# Patient Record
Sex: MALE | Race: WHITE | NOT HISPANIC OR LATINO | URBAN - METROPOLITAN AREA
[De-identification: names, ages, dates, MRNs, and addresses within clinical notes are randomized per-mention and may not be internally consistent; named-entity substitution may affect disease eponyms.]

---

## 2018-01-02 ENCOUNTER — OFFICE VISIT CONVERTED (OUTPATIENT)
Dept: CARDIOLOGY | Facility: CLINIC | Age: 68
End: 2018-01-02
Attending: SPECIALIST

## 2018-01-02 ENCOUNTER — CONVERSION ENCOUNTER (OUTPATIENT)
Dept: OTHER | Facility: HOSPITAL | Age: 68
End: 2018-01-02

## 2018-02-14 ENCOUNTER — CONVERSION ENCOUNTER (OUTPATIENT)
Dept: FAMILY MEDICINE CLINIC | Facility: CLINIC | Age: 68
End: 2018-02-14

## 2018-02-14 ENCOUNTER — OFFICE VISIT CONVERTED (OUTPATIENT)
Dept: FAMILY MEDICINE CLINIC | Facility: CLINIC | Age: 68
End: 2018-02-14
Attending: NURSE PRACTITIONER

## 2018-04-10 ENCOUNTER — OFFICE VISIT CONVERTED (OUTPATIENT)
Dept: CARDIOLOGY | Facility: CLINIC | Age: 68
End: 2018-04-10
Attending: SPECIALIST

## 2018-05-22 ENCOUNTER — CONVERSION ENCOUNTER (OUTPATIENT)
Dept: FAMILY MEDICINE CLINIC | Facility: CLINIC | Age: 68
End: 2018-05-22

## 2018-05-22 ENCOUNTER — OFFICE VISIT CONVERTED (OUTPATIENT)
Dept: FAMILY MEDICINE CLINIC | Facility: CLINIC | Age: 68
End: 2018-05-22
Attending: NURSE PRACTITIONER

## 2018-07-03 ENCOUNTER — CONVERSION ENCOUNTER (OUTPATIENT)
Dept: FAMILY MEDICINE CLINIC | Facility: CLINIC | Age: 68
End: 2018-07-03

## 2018-07-03 ENCOUNTER — OFFICE VISIT CONVERTED (OUTPATIENT)
Dept: FAMILY MEDICINE CLINIC | Facility: CLINIC | Age: 68
End: 2018-07-03
Attending: NURSE PRACTITIONER

## 2018-07-10 ENCOUNTER — OFFICE VISIT CONVERTED (OUTPATIENT)
Dept: CARDIOLOGY | Facility: CLINIC | Age: 68
End: 2018-07-10
Attending: SPECIALIST

## 2018-08-15 ENCOUNTER — OFFICE VISIT CONVERTED (OUTPATIENT)
Dept: FAMILY MEDICINE CLINIC | Facility: CLINIC | Age: 68
End: 2018-08-15
Attending: NURSE PRACTITIONER

## 2018-08-20 ENCOUNTER — OFFICE VISIT CONVERTED (OUTPATIENT)
Dept: FAMILY MEDICINE CLINIC | Facility: CLINIC | Age: 68
End: 2018-08-20
Attending: NURSE PRACTITIONER

## 2018-10-16 ENCOUNTER — CONVERSION ENCOUNTER (OUTPATIENT)
Dept: CARDIOLOGY | Facility: CLINIC | Age: 68
End: 2018-10-16

## 2018-10-16 ENCOUNTER — OFFICE VISIT CONVERTED (OUTPATIENT)
Dept: CARDIOLOGY | Facility: CLINIC | Age: 68
End: 2018-10-16
Attending: SPECIALIST

## 2018-11-27 ENCOUNTER — OFFICE VISIT CONVERTED (OUTPATIENT)
Dept: FAMILY MEDICINE CLINIC | Facility: CLINIC | Age: 68
End: 2018-11-27
Attending: NURSE PRACTITIONER

## 2018-12-05 ENCOUNTER — OFFICE VISIT CONVERTED (OUTPATIENT)
Dept: FAMILY MEDICINE CLINIC | Facility: CLINIC | Age: 68
End: 2018-12-05
Attending: NURSE PRACTITIONER

## 2018-12-26 ENCOUNTER — OFFICE VISIT CONVERTED (OUTPATIENT)
Dept: UROLOGY | Facility: CLINIC | Age: 68
End: 2018-12-26
Attending: UROLOGY

## 2018-12-26 ENCOUNTER — CONVERSION ENCOUNTER (OUTPATIENT)
Dept: SURGERY | Facility: CLINIC | Age: 68
End: 2018-12-26

## 2019-01-10 ENCOUNTER — OFFICE VISIT CONVERTED (OUTPATIENT)
Dept: FAMILY MEDICINE CLINIC | Facility: CLINIC | Age: 69
End: 2019-01-10
Attending: FAMILY MEDICINE

## 2019-01-10 ENCOUNTER — CONVERSION ENCOUNTER (OUTPATIENT)
Dept: FAMILY MEDICINE CLINIC | Facility: CLINIC | Age: 69
End: 2019-01-10

## 2019-02-14 ENCOUNTER — OFFICE VISIT CONVERTED (OUTPATIENT)
Dept: FAMILY MEDICINE CLINIC | Facility: CLINIC | Age: 69
End: 2019-02-14
Attending: FAMILY MEDICINE

## 2019-02-19 ENCOUNTER — OFFICE VISIT CONVERTED (OUTPATIENT)
Dept: CARDIOLOGY | Facility: CLINIC | Age: 69
End: 2019-02-19
Attending: SPECIALIST

## 2019-02-19 ENCOUNTER — CONVERSION ENCOUNTER (OUTPATIENT)
Dept: OTHER | Facility: HOSPITAL | Age: 69
End: 2019-02-19

## 2019-06-21 ENCOUNTER — OFFICE VISIT CONVERTED (OUTPATIENT)
Dept: CARDIOLOGY | Facility: CLINIC | Age: 69
End: 2019-06-21
Attending: SPECIALIST

## 2019-06-21 ENCOUNTER — CONVERSION ENCOUNTER (OUTPATIENT)
Dept: CARDIOLOGY | Facility: CLINIC | Age: 69
End: 2019-06-21

## 2019-08-14 ENCOUNTER — HOSPITAL ENCOUNTER (OUTPATIENT)
Dept: FAMILY MEDICINE CLINIC | Facility: CLINIC | Age: 69
Discharge: HOME | End: 2019-08-14
Attending: FAMILY MEDICINE

## 2019-08-14 ENCOUNTER — OFFICE VISIT CONVERTED (OUTPATIENT)
Dept: FAMILY MEDICINE CLINIC | Facility: CLINIC | Age: 69
End: 2019-08-14
Attending: FAMILY MEDICINE

## 2019-08-14 LAB
ALBUMIN SERPL-MCNC: 4.3 G/DL (ref 3.5–5)
ALBUMIN/GLOB SERPL: 1.4 {RATIO} (ref 1.4–2.6)
ALP SERPL-CCNC: 66 U/L (ref 56–155)
ALT SERPL-CCNC: 39 U/L (ref 10–40)
ANION GAP SERPL CALC-SCNC: 23 MMOL/L (ref 8–19)
APPEARANCE UR: ABNORMAL
AST SERPL-CCNC: 30 U/L (ref 15–50)
BILIRUB SERPL-MCNC: 1.07 MG/DL (ref 0.2–1.3)
BILIRUB UR QL: NEGATIVE
BUN SERPL-MCNC: 12 MG/DL (ref 5–25)
BUN/CREAT SERPL: 15 {RATIO} (ref 6–20)
CALCIUM SERPL-MCNC: 9.4 MG/DL (ref 8.7–10.4)
CHLORIDE SERPL-SCNC: 101 MMOL/L (ref 99–111)
CHOLEST SERPL-MCNC: 147 MG/DL (ref 107–200)
CHOLEST/HDLC SERPL: 5.9 {RATIO} (ref 3–6)
COLOR UR: YELLOW
CONV BACTERIA: NEGATIVE
CONV CO2: 20 MMOL/L (ref 22–32)
CONV COLLECTION SOURCE (UA): ABNORMAL
CONV CREATININE URINE, RANDOM: 122.2 MG/DL (ref 10–300)
CONV CRYSTALS: ABNORMAL /[HPF]
CONV MICROALBUM.,U,RANDOM: 102.6 MG/L (ref 0–20)
CONV TOTAL PROTEIN: 7.4 G/DL (ref 6.3–8.2)
CONV UROBILINOGEN IN URINE BY AUTOMATED TEST STRIP: 1 {EHRLICHU}/DL (ref 0.1–1)
CREAT UR-MCNC: 0.78 MG/DL (ref 0.7–1.2)
EST. AVERAGE GLUCOSE BLD GHB EST-MCNC: 200 MG/DL
GFR SERPLBLD BASED ON 1.73 SQ M-ARVRAT: >60 ML/MIN/{1.73_M2}
GLOBULIN UR ELPH-MCNC: 3.1 G/DL (ref 2–3.5)
GLUCOSE SERPL-MCNC: 266 MG/DL (ref 70–99)
GLUCOSE UR QL: >=1000 MG/DL
HBA1C MFR BLD: 8.6 % (ref 3.5–5.7)
HDLC SERPL-MCNC: 25 MG/DL (ref 40–60)
HGB UR QL STRIP: ABNORMAL
KETONES UR QL STRIP: NEGATIVE MG/DL
LDLC SERPL CALC-MCNC: 79 MG/DL (ref 70–100)
LEUKOCYTE ESTERASE UR QL STRIP: ABNORMAL
MICROALBUMIN/CREAT UR: 84 MG/G{CRE} (ref 0–25)
NITRITE UR QL STRIP: NEGATIVE
OSMOLALITY SERPL CALC.SUM OF ELEC: 297 MOSM/KG (ref 273–304)
PH UR STRIP.AUTO: 5 [PH] (ref 5–8)
POTASSIUM SERPL-SCNC: 4.6 MMOL/L (ref 3.5–5.3)
PROT UR QL: ABNORMAL MG/DL
PSA SERPL-MCNC: 0.52 NG/ML (ref 0–4)
RBC #/AREA URNS HPF: ABNORMAL /[HPF]
SODIUM SERPL-SCNC: 139 MMOL/L (ref 135–147)
SP GR UR: 1.03 (ref 1–1.03)
TRIGL SERPL-MCNC: 217 MG/DL (ref 40–150)
VIT B12 SERPL-MCNC: 390 PG/ML (ref 211–911)
VLDLC SERPL-MCNC: 43 MG/DL (ref 5–37)
WBC #/AREA URNS HPF: ABNORMAL /[HPF]

## 2019-08-15 LAB — 25(OH)D3 SERPL-MCNC: 47.6 NG/ML (ref 30–100)

## 2019-10-10 ENCOUNTER — OFFICE VISIT CONVERTED (OUTPATIENT)
Dept: FAMILY MEDICINE CLINIC | Facility: CLINIC | Age: 69
End: 2019-10-10
Attending: FAMILY MEDICINE

## 2019-10-10 ENCOUNTER — CONVERSION ENCOUNTER (OUTPATIENT)
Dept: FAMILY MEDICINE CLINIC | Facility: CLINIC | Age: 69
End: 2019-10-10

## 2019-11-26 ENCOUNTER — OFFICE VISIT CONVERTED (OUTPATIENT)
Dept: CARDIOLOGY | Facility: CLINIC | Age: 69
End: 2019-11-26
Attending: SPECIALIST

## 2019-12-10 ENCOUNTER — OFFICE VISIT CONVERTED (OUTPATIENT)
Dept: FAMILY MEDICINE CLINIC | Facility: CLINIC | Age: 69
End: 2019-12-10
Attending: FAMILY MEDICINE

## 2019-12-10 ENCOUNTER — CONVERSION ENCOUNTER (OUTPATIENT)
Dept: FAMILY MEDICINE CLINIC | Facility: CLINIC | Age: 69
End: 2019-12-10

## 2019-12-16 ENCOUNTER — HOSPITAL ENCOUNTER (OUTPATIENT)
Dept: GENERAL RADIOLOGY | Facility: HOSPITAL | Age: 69
Discharge: HOME | End: 2019-12-16
Attending: FAMILY MEDICINE

## 2020-04-02 ENCOUNTER — HOSPITAL ENCOUNTER (OUTPATIENT)
Dept: FAMILY MEDICINE CLINIC | Facility: CLINIC | Age: 70
Discharge: HOME | End: 2020-04-02
Attending: FAMILY MEDICINE

## 2020-04-02 LAB
ALBUMIN SERPL-MCNC: 4 G/DL (ref 3.5–5)
ALBUMIN/GLOB SERPL: 1.2 {RATIO} (ref 1.4–2.6)
ALP SERPL-CCNC: 67 U/L (ref 56–155)
ALT SERPL-CCNC: 20 U/L (ref 10–40)
ANION GAP SERPL CALC-SCNC: 15 MMOL/L (ref 8–19)
AST SERPL-CCNC: 16 U/L (ref 15–50)
BASOPHILS # BLD AUTO: 0.04 10*3/UL (ref 0–0.2)
BASOPHILS NFR BLD AUTO: 0.4 % (ref 0–3)
BILIRUB SERPL-MCNC: 0.58 MG/DL (ref 0.2–1.3)
BUN SERPL-MCNC: 12 MG/DL (ref 5–25)
BUN/CREAT SERPL: 15 {RATIO} (ref 6–20)
CALCIUM SERPL-MCNC: 9.4 MG/DL (ref 8.7–10.4)
CHLORIDE SERPL-SCNC: 101 MMOL/L (ref 99–111)
CHOLEST SERPL-MCNC: 156 MG/DL (ref 107–200)
CHOLEST/HDLC SERPL: 5.6 {RATIO} (ref 3–6)
CONV ABS IMM GRAN: 0.06 10*3/UL (ref 0–0.2)
CONV CO2: 25 MMOL/L (ref 22–32)
CONV IMMATURE GRAN: 0.6 % (ref 0–1.8)
CONV TOTAL PROTEIN: 7.3 G/DL (ref 6.3–8.2)
CREAT UR-MCNC: 0.79 MG/DL (ref 0.7–1.2)
DEPRECATED RDW RBC AUTO: 50 FL (ref 35.1–43.9)
EOSINOPHIL # BLD AUTO: 0.24 10*3/UL (ref 0–0.7)
EOSINOPHIL # BLD AUTO: 2.6 % (ref 0–7)
ERYTHROCYTE [DISTWIDTH] IN BLOOD BY AUTOMATED COUNT: 14.9 % (ref 11.6–14.4)
EST. AVERAGE GLUCOSE BLD GHB EST-MCNC: 197 MG/DL
GFR SERPLBLD BASED ON 1.73 SQ M-ARVRAT: >60 ML/MIN/{1.73_M2}
GLOBULIN UR ELPH-MCNC: 3.3 G/DL (ref 2–3.5)
GLUCOSE SERPL-MCNC: 156 MG/DL (ref 70–99)
HBA1C MFR BLD: 8.5 % (ref 3.5–5.7)
HCT VFR BLD AUTO: 46.8 % (ref 42–52)
HDLC SERPL-MCNC: 28 MG/DL (ref 40–60)
HGB BLD-MCNC: 15.3 G/DL (ref 14–18)
LDLC SERPL CALC-MCNC: 98 MG/DL (ref 70–100)
LYMPHOCYTES # BLD AUTO: 1.9 10*3/UL (ref 1–5)
LYMPHOCYTES NFR BLD AUTO: 20.5 % (ref 20–45)
MCH RBC QN AUTO: 29.7 PG (ref 27–31)
MCHC RBC AUTO-ENTMCNC: 32.7 G/DL (ref 33–37)
MCV RBC AUTO: 90.9 FL (ref 80–96)
MONOCYTES # BLD AUTO: 0.66 10*3/UL (ref 0.2–1.2)
MONOCYTES NFR BLD AUTO: 7.1 % (ref 3–10)
NEUTROPHILS # BLD AUTO: 6.35 10*3/UL (ref 2–8)
NEUTROPHILS NFR BLD AUTO: 68.8 % (ref 30–85)
NRBC CBCN: 0 % (ref 0–0.7)
OSMOLALITY SERPL CALC.SUM OF ELEC: 287 MOSM/KG (ref 273–304)
PLATELET # BLD AUTO: 186 10*3/UL (ref 130–400)
PMV BLD AUTO: 11.4 FL (ref 9.4–12.4)
POTASSIUM SERPL-SCNC: 3.8 MMOL/L (ref 3.5–5.3)
RBC # BLD AUTO: 5.15 10*6/UL (ref 4.7–6.1)
SODIUM SERPL-SCNC: 137 MMOL/L (ref 135–147)
TRIGL SERPL-MCNC: 152 MG/DL (ref 40–150)
VLDLC SERPL-MCNC: 30 MG/DL (ref 5–37)
WBC # BLD AUTO: 9.25 10*3/UL (ref 4.8–10.8)

## 2020-04-13 ENCOUNTER — TELEPHONE CONVERTED (OUTPATIENT)
Dept: FAMILY MEDICINE CLINIC | Facility: CLINIC | Age: 70
End: 2020-04-13
Attending: FAMILY MEDICINE

## 2020-06-01 ENCOUNTER — HOSPITAL ENCOUNTER (OUTPATIENT)
Dept: GENERAL RADIOLOGY | Facility: HOSPITAL | Age: 70
Discharge: HOME | End: 2020-06-01
Attending: FAMILY MEDICINE

## 2020-06-02 ENCOUNTER — OFFICE VISIT CONVERTED (OUTPATIENT)
Dept: CARDIOLOGY | Facility: CLINIC | Age: 70
End: 2020-06-02
Attending: SPECIALIST

## 2020-06-02 ENCOUNTER — CONVERSION ENCOUNTER (OUTPATIENT)
Dept: CARDIOLOGY | Facility: CLINIC | Age: 70
End: 2020-06-02

## 2021-05-12 NOTE — PROGRESS NOTES
Quick Note      Patient Name: Migel Ko   Patient ID: [id number]   Sex: Male   YOB: 1950    Primary Care Provider: Mark Novoa DO   Referring Provider: Mark Novoa DO    Visit Date: April 13, 2020    Provider: Mark Novoa DO   Location: [facility]   Location Address: [address] [place] [address], [address] [place] [address]  [id number]   Location Phone: [phone]          History Of Present Illness  TELEHEALTH VISIT  Chief Complaint: check up   Migel Ko is a 69 year old /White male who is presenting for evaluation via telehealth visit. Verbal consent obtained before beginning visit.   Provider spent 25 minutes with the patient during telehealth visit.   The following staff were present during this visit: none   Past Medical History/Overview of Patient Symptoms     Patient presents today for a telehealth visit done by way of phone.  He is unaccompanied.  He had a chest CT done back on 12/16/2019 which showed stable right upper lobe nodules measuring 5 mm and 3 mm.  In the left lower lobe there was soft tissue or mucus within a mildly dilated bronchus with several adjacent stellate or spiculated densities measuring up to 1.3 cm.  This was new compared to 2017 and increased from prior CT done on 12/7/2018.  It could represent a chronic infectious or inflammatory process neoplasm thought to be in differential but less likely with recommended follow-up in 4 to 6 months.  Discussed ordering this chest CT to follow-up.  I reviewed his labs.  His LDL is 98.  Previously was closer to goal.  He does see cardiology already.  We will continue with pravastatin at this time.  He does have an appointment in June.  CBC and CMP are overall unremarkable.  His A1c is not at goal at 8.5%.  It appears about steady in that range.  We will increase his Lantus to 65 units.  He will continue with Janumet.  Patient encouraged to be on a diabetic diet.  He admits he could be doing better with  this.  Labs and imaging reviewed.           Assessment  · Diabetes mellitus, type 2     250.00/E11.9  · Pulmonary nodules     793.19/R91.8  · Abnormal chest CT     793.2/R93.89  · Medication monitoring encounter     V58.83/Z51.81  · HLD (hyperlipidemia)     272.4/E78.5  · CAD (coronary artery disease)     414.00/I25.10      Plan  · Orders  o Hgb A1c [facility] ([id number]) - 250.00/E11.9 - 07/13/2020  o Lipid Panel [facility] ([id number]) - 272.4/E78.5 - 07/13/2020  o Physician Telephone Evaluation, 21-30 minutes ([id number]) - - 04/13/2020  o CT Chest without Contrast [facility] ([id number]) - 793.19/R91.8, 793.2/R93.89 - 05/13/2020   Follow-up from chest CT 12/16/2019. Stellate or spiculated densities measuring up to 1.3 cm in left lower lobe. Stable nodules in right upper lobe measuring 0.5 cm and 0.3 cm.  · Medications  o Lantus Solostar U-100 Insulin 100 unit/mL (3 mL) subcutaneous insulin pen   SIG: INJECT 65 UNITS SUBCUTANEOUSLY ONCE DAILY   DISP: (20) 3 ml syringes with 3 refills  Adjusted on 04/13/2020     o pravastatin 40 mg oral tablet   SIG: take 1 tablet (40 mg) by oral route once daily for 90 days   DISP: (90) Tablet with 3 refills  Discontinued on 04/13/2020     · Instructions  o Continue blood sugar monitoring daily and record. Bring your log to office visits. Call the office for readings below 70 and above 250 or any complications.  o Discussed with patient blood pressure monitoring, hemoglobin A1C levels need to be below 7.0, and LDL (Lipid) goals below 70.  o Plan Of Care:   o Take all medications as prescribed/directed.  o Patient instructed/educated on their diet and exercise program.  o I spent 25 minutes on medical discussion with patient. Changes as documented above. Patient to call with any questions or concerns. Medications reviewed and reconciled today.  · Disposition  o Follow Up in 3 months.            Electronically Signed by: Mark Novoa DO -Author on April 13, 2020 10:21:14 AM

## 2021-05-13 NOTE — PROGRESS NOTES
Progress Note      Patient Name: Migel Ko   Patient ID: [id number]   Sex: Male   YOB: 1950    Primary Care Provider: Mark Novoa DO   Referring Provider: Mark Novoa DO    Visit Date: June 2, 2020    Provider: Ethan Hoover MD   Location: [facility]   Location Address: [address] [place] [address], [address]   [place]  [id number]   Location Phone: [phone]          Chief Complaint  · Coronary artery disease   · Angina      History Of Present Illness  Migel Ko is a 69 year old /White male with a history of coronary artery disease, stable angina. He denies any chest pain. Shortness of breath is stable.   CURRENT MEDICATIONS: include NTG p.r.n.; Metoprolol 25 mg daily; Isosorbide Mononitrate 60 mg daily; Lisinopril 2.5 mg daily; Pravastatin 40 mg daily; Clopidogrel 75 mg daily; ASA 81 mg daily; Janumet 50/1000 mg b.i.d.; Loratadine 10 mg p.r.n.; Ventolin; Digoxin 0.125 mg daily; Lantus; Centrum Silver. The dosage and frequency of the medications were reviewed with the patient.   PAST MEDICAL HISTORY: Positive for coronary artery disease, stable angina and diabetes.   FAMILY HISTORY: Positive for diabetes. Negative for hypertension or heart disease.   PSYCHOSOCIAL HISTORY: He never used alcohol or tobacco.       Review of Systems  · Cardiovascular  o Denies  o : palpitations (fast, fluttering, or skipping beats), swelling (feet, ankles, hands), shortness of breath while walking or lying flat, chest pain or angina pectoris   · Respiratory  o Admits  o : asthma or wheezing  o Denies  o : chronic or frequent cough      Vitals  Date Time BP Position Site L\R Cuff Size HR RR TEMP (F) WT  HT  BMI kg/m2 BSA m2 O2 Sat HC       06/02/2020 12:12 /72 Sitting    90 - R   166lbs 16oz 6'   22.65 1.96           Physical Examination  · Constitutional  o Appearance  o : Awake, alert, cooperative, pleasant.  · Respiratory  o Inspection of Chest  o :  No chest wall deformities, moving equal.  o Auscultation of Lungs  o : Good air entry with vesicular breath sounds.  · Cardiovascular  o Heart  o :   § Auscultation of Heart  § : S1 and S2 regular. No S3. No S4. No murmurs.  o Peripheral Vascular System  o :   § Extremities  § : Peripheral pulses were well felt. No edema. No cyanosis.  · Gastrointestinal  o Abdominal Examination  o : No masses or organomegaly noted.     EKG was performed in the office today.  Indication:       coronary artery disease.  Results:           sinus rhythm, left anterior fascicular block, left ventricular hypertrophy.  Comparison:    No change from previous EKG.                 Assessment     ASSESSMENT AND PLAN:    1.  Coronary artery disease, stable angina:  Continue current dose of Plavix.  2.  Hyperlipidemia:  Continue Pravastatin.  3.  Type II diabetes mellitus, without complications, stable.  4.  See me back in 6 months.    Ethan Hoover MD, [facility]  [facility]/desire           This note was transcribed by Lakshmi Hutchinson.  desire/[facility]OHA  The above service was transcribed by Lakshmi Hutchinson, and I attest to the accuracy of the note.  [facility]               Electronically Signed by: Lakshmi Hutchinson-, -Author on June 9, 2020 08:36:35 AM  Electronically Co-signed by: Ethan Hoover MD -Reviewer on June 21, 2020 10:50:12 AM

## 2021-05-15 VITALS
BODY MASS INDEX: 23.3 KG/M2 | HEIGHT: 72 IN | HEART RATE: 82 BPM | DIASTOLIC BLOOD PRESSURE: 72 MMHG | SYSTOLIC BLOOD PRESSURE: 124 MMHG | WEIGHT: 172 LBS

## 2021-05-15 VITALS
OXYGEN SATURATION: 97 % | BODY MASS INDEX: 23.33 KG/M2 | HEIGHT: 72 IN | WEIGHT: 172.25 LBS | SYSTOLIC BLOOD PRESSURE: 120 MMHG | TEMPERATURE: 97.7 F | DIASTOLIC BLOOD PRESSURE: 72 MMHG | HEART RATE: 75 BPM

## 2021-05-15 VITALS
OXYGEN SATURATION: 95 % | BODY MASS INDEX: 23.62 KG/M2 | DIASTOLIC BLOOD PRESSURE: 88 MMHG | SYSTOLIC BLOOD PRESSURE: 138 MMHG | HEART RATE: 84 BPM | WEIGHT: 174.37 LBS | TEMPERATURE: 98.2 F | HEIGHT: 72 IN

## 2021-05-15 VITALS
BODY MASS INDEX: 23.23 KG/M2 | OXYGEN SATURATION: 96 % | HEART RATE: 95 BPM | WEIGHT: 171.5 LBS | SYSTOLIC BLOOD PRESSURE: 108 MMHG | HEIGHT: 72 IN | DIASTOLIC BLOOD PRESSURE: 64 MMHG | TEMPERATURE: 98.3 F

## 2021-05-15 VITALS
HEIGHT: 72 IN | HEART RATE: 90 BPM | SYSTOLIC BLOOD PRESSURE: 116 MMHG | WEIGHT: 167 LBS | DIASTOLIC BLOOD PRESSURE: 72 MMHG | BODY MASS INDEX: 22.62 KG/M2

## 2021-05-15 VITALS
BODY MASS INDEX: 23.03 KG/M2 | DIASTOLIC BLOOD PRESSURE: 90 MMHG | HEART RATE: 84 BPM | HEIGHT: 72 IN | WEIGHT: 170 LBS | SYSTOLIC BLOOD PRESSURE: 108 MMHG

## 2021-05-16 VITALS
WEIGHT: 158.37 LBS | HEART RATE: 78 BPM | OXYGEN SATURATION: 97 % | BODY MASS INDEX: 21.45 KG/M2 | SYSTOLIC BLOOD PRESSURE: 100 MMHG | TEMPERATURE: 97.5 F | HEIGHT: 72 IN | DIASTOLIC BLOOD PRESSURE: 74 MMHG

## 2021-05-16 VITALS
DIASTOLIC BLOOD PRESSURE: 72 MMHG | HEIGHT: 72 IN | SYSTOLIC BLOOD PRESSURE: 102 MMHG | BODY MASS INDEX: 22.89 KG/M2 | HEART RATE: 86 BPM | WEIGHT: 169 LBS

## 2021-05-16 VITALS
DIASTOLIC BLOOD PRESSURE: 68 MMHG | HEART RATE: 84 BPM | BODY MASS INDEX: 22.35 KG/M2 | HEIGHT: 72 IN | WEIGHT: 165 LBS | SYSTOLIC BLOOD PRESSURE: 90 MMHG

## 2021-05-16 VITALS
SYSTOLIC BLOOD PRESSURE: 126 MMHG | TEMPERATURE: 98.3 F | OXYGEN SATURATION: 95 % | DIASTOLIC BLOOD PRESSURE: 66 MMHG | WEIGHT: 172 LBS | HEART RATE: 72 BPM | HEIGHT: 72 IN | BODY MASS INDEX: 23.3 KG/M2

## 2021-05-16 VITALS
HEART RATE: 88 BPM | TEMPERATURE: 98.1 F | DIASTOLIC BLOOD PRESSURE: 70 MMHG | OXYGEN SATURATION: 97 % | HEIGHT: 72 IN | WEIGHT: 158 LBS | SYSTOLIC BLOOD PRESSURE: 118 MMHG | BODY MASS INDEX: 21.4 KG/M2

## 2021-05-16 VITALS
TEMPERATURE: 98.6 F | OXYGEN SATURATION: 100 % | BODY MASS INDEX: 22.26 KG/M2 | DIASTOLIC BLOOD PRESSURE: 64 MMHG | HEART RATE: 93 BPM | SYSTOLIC BLOOD PRESSURE: 120 MMHG | WEIGHT: 164.37 LBS | HEIGHT: 72 IN

## 2021-05-16 VITALS
HEIGHT: 72 IN | DIASTOLIC BLOOD PRESSURE: 74 MMHG | HEART RATE: 84 BPM | WEIGHT: 160.25 LBS | OXYGEN SATURATION: 95 % | TEMPERATURE: 98.3 F | SYSTOLIC BLOOD PRESSURE: 110 MMHG | BODY MASS INDEX: 21.71 KG/M2

## 2021-05-16 VITALS
SYSTOLIC BLOOD PRESSURE: 112 MMHG | TEMPERATURE: 97.5 F | HEART RATE: 88 BPM | RESPIRATION RATE: 20 BRPM | DIASTOLIC BLOOD PRESSURE: 78 MMHG | BODY MASS INDEX: 22.48 KG/M2 | WEIGHT: 166 LBS | OXYGEN SATURATION: 95 % | HEIGHT: 72 IN

## 2021-05-16 VITALS
HEIGHT: 72 IN | TEMPERATURE: 98.1 F | BODY MASS INDEX: 22.63 KG/M2 | WEIGHT: 167.12 LBS | DIASTOLIC BLOOD PRESSURE: 68 MMHG | SYSTOLIC BLOOD PRESSURE: 88 MMHG | HEART RATE: 89 BPM | OXYGEN SATURATION: 95 %

## 2021-05-16 VITALS
SYSTOLIC BLOOD PRESSURE: 104 MMHG | BODY MASS INDEX: 22.4 KG/M2 | DIASTOLIC BLOOD PRESSURE: 74 MMHG | HEIGHT: 72 IN | WEIGHT: 165.37 LBS

## 2021-05-16 VITALS
HEIGHT: 72 IN | BODY MASS INDEX: 21.81 KG/M2 | SYSTOLIC BLOOD PRESSURE: 112 MMHG | HEART RATE: 78 BPM | WEIGHT: 161 LBS | DIASTOLIC BLOOD PRESSURE: 78 MMHG

## 2021-05-16 VITALS
BODY MASS INDEX: 21.6 KG/M2 | SYSTOLIC BLOOD PRESSURE: 102 MMHG | TEMPERATURE: 98.5 F | WEIGHT: 159.5 LBS | HEIGHT: 72 IN | OXYGEN SATURATION: 94 % | DIASTOLIC BLOOD PRESSURE: 62 MMHG | HEART RATE: 89 BPM

## 2021-05-16 VITALS
BODY MASS INDEX: 22.21 KG/M2 | TEMPERATURE: 98.3 F | OXYGEN SATURATION: 96 % | DIASTOLIC BLOOD PRESSURE: 64 MMHG | SYSTOLIC BLOOD PRESSURE: 122 MMHG | HEART RATE: 66 BPM | HEIGHT: 72 IN | WEIGHT: 164 LBS | RESPIRATION RATE: 16 BRPM

## 2021-05-16 VITALS
HEIGHT: 72 IN | WEIGHT: 158 LBS | DIASTOLIC BLOOD PRESSURE: 70 MMHG | HEART RATE: 80 BPM | SYSTOLIC BLOOD PRESSURE: 104 MMHG | BODY MASS INDEX: 21.4 KG/M2

## 2021-05-16 VITALS
WEIGHT: 163 LBS | DIASTOLIC BLOOD PRESSURE: 64 MMHG | SYSTOLIC BLOOD PRESSURE: 96 MMHG | HEART RATE: 80 BPM | BODY MASS INDEX: 22.08 KG/M2 | HEIGHT: 72 IN

## 2022-08-18 ENCOUNTER — OFFICE VISIT (OUTPATIENT)
Dept: URBAN - METROPOLITAN AREA CLINIC 85 | Facility: CLINIC | Age: 72
End: 2022-08-18
Payer: COMMERCIAL

## 2022-08-18 DIAGNOSIS — H33.052 TOTAL RETINAL DETACHMENT, LEFT EYE: Primary | ICD-10-CM

## 2022-08-18 PROCEDURE — 92134 CPTRZ OPH DX IMG PST SGM RTA: CPT | Performed by: OPHTHALMOLOGY

## 2022-08-18 PROCEDURE — 92004 COMPRE OPH EXAM NEW PT 1/>: CPT | Performed by: OPHTHALMOLOGY

## 2022-08-18 ASSESSMENT — INTRAOCULAR PRESSURE
OS: 8
OD: 10

## 2022-08-18 NOTE — IMPRESSION/PLAN
Impression: Total retinal detachment D1 PVR, left eye: H33.052. Plan: The findings were discussed with the patient, along with the risks and benefits of a vitrectomy/laser/MP/silicone oil/PFO OS and the patient will let us know if they would like to be scheduled for surgery.

## 2022-08-23 ENCOUNTER — ADULT PHYSICAL (OUTPATIENT)
Dept: URBAN - METROPOLITAN AREA CLINIC 85 | Facility: CLINIC | Age: 72
End: 2022-08-23
Payer: COMMERCIAL

## 2022-08-23 DIAGNOSIS — Z01.818 ENCOUNTER FOR OTHER PREPROCEDURAL EXAMINATION: Primary | ICD-10-CM

## 2022-08-23 DIAGNOSIS — H33.052 TOTAL RETINAL DETACHMENT, LEFT EYE: ICD-10-CM

## 2022-08-23 PROCEDURE — 99203 OFFICE O/P NEW LOW 30 MIN: CPT | Performed by: PHYSICIAN ASSISTANT

## 2022-09-06 ENCOUNTER — SURGERY (OUTPATIENT)
Dept: URBAN - METROPOLITAN AREA SURGERY 55 | Facility: SURGERY | Age: 72
End: 2022-09-06
Payer: COMMERCIAL

## 2022-09-06 PROCEDURE — 67113 REPAIR RETINAL DETACH CPLX: CPT | Performed by: OPHTHALMOLOGY

## 2022-09-06 RX ORDER — HYDROCODONE BITARTRATE AND ACETAMINOPHEN 5; 325 MG/1; MG/1
TABLET ORAL
Qty: 10 | Refills: 0 | Status: ACTIVE
Start: 2022-09-06

## 2022-09-06 RX ORDER — PREDNISOLONE ACETATE 10 MG/ML
1 % SUSPENSION/ DROPS OPHTHALMIC
Qty: 5 | Refills: 1 | Status: ACTIVE
Start: 2022-09-06

## 2022-09-06 RX ORDER — OFLOXACIN 3 MG/ML
0.3 % SOLUTION/ DROPS OPHTHALMIC
Qty: 5 | Refills: 1 | Status: ACTIVE
Start: 2022-09-06

## 2022-09-07 ENCOUNTER — POST-OPERATIVE VISIT (OUTPATIENT)
Dept: URBAN - METROPOLITAN AREA CLINIC 85 | Facility: CLINIC | Age: 72
End: 2022-09-07
Payer: COMMERCIAL

## 2022-09-07 DIAGNOSIS — Z48.810 ENCOUNTER FOR SURGICAL AFTERCARE FOLLOWING SURGERY ON THE SENSE ORGANS: Primary | ICD-10-CM

## 2022-09-07 PROCEDURE — 99024 POSTOP FOLLOW-UP VISIT: CPT | Performed by: OPTOMETRIST

## 2022-09-07 ASSESSMENT — INTRAOCULAR PRESSURE: OS: 8

## 2022-09-22 ENCOUNTER — OFFICE VISIT (OUTPATIENT)
Dept: URBAN - METROPOLITAN AREA CLINIC 85 | Facility: CLINIC | Age: 72
End: 2022-09-22
Payer: COMMERCIAL

## 2022-09-22 DIAGNOSIS — H33.052 TOTAL RETINAL DETACHMENT, LEFT EYE: Primary | ICD-10-CM

## 2022-09-22 PROCEDURE — 99024 POSTOP FOLLOW-UP VISIT: CPT | Performed by: OPHTHALMOLOGY

## 2022-09-22 PROCEDURE — 92134 CPTRZ OPH DX IMG PST SGM RTA: CPT | Performed by: OPHTHALMOLOGY

## 2022-09-22 ASSESSMENT — INTRAOCULAR PRESSURE
OS: 10
OD: 14

## 2022-09-22 NOTE — IMPRESSION/PLAN
Impression: Total retinal detachment D1 PVR, left eye: H33.052. Plan: The findings were discussed with the patient and unfortunately he has had a recurrent total detachment and we will proceed with comfort care at this point. There is no treatment available at this time. The patient will taper his post op drops and return to clinic in 2 months for a dilated follow up and possible oct.

## 2022-12-08 ENCOUNTER — OFFICE VISIT (OUTPATIENT)
Dept: URBAN - METROPOLITAN AREA CLINIC 85 | Facility: CLINIC | Age: 72
End: 2022-12-08
Payer: COMMERCIAL

## 2022-12-08 DIAGNOSIS — H33.052 TOTAL RETINAL DETACHMENT, LEFT EYE: Primary | ICD-10-CM

## 2022-12-08 PROCEDURE — 92134 CPTRZ OPH DX IMG PST SGM RTA: CPT | Performed by: OPHTHALMOLOGY

## 2022-12-08 PROCEDURE — 99024 POSTOP FOLLOW-UP VISIT: CPT | Performed by: OPHTHALMOLOGY

## 2022-12-08 ASSESSMENT — INTRAOCULAR PRESSURE: OD: 14

## 2022-12-08 NOTE — IMPRESSION/PLAN
Impression: Total retinal detachment D1 PVR, left eye: H33.052. Plan: The findings were discussed with the patient and unfortunately he has had a recurrent total detachment and we will proceed with comfort care at this point. There is no treatment available at this time. The patient will taper his post op drops and return to clinic in 6 weeks for a dilated follow up and possible oct.

## 2023-01-18 ENCOUNTER — OFFICE VISIT (OUTPATIENT)
Dept: URBAN - METROPOLITAN AREA CLINIC 85 | Facility: CLINIC | Age: 73
End: 2023-01-18
Payer: COMMERCIAL

## 2023-01-18 DIAGNOSIS — H33.052 TOTAL RETINAL DETACHMENT, LEFT EYE: Primary | ICD-10-CM

## 2023-01-18 PROCEDURE — 92014 COMPRE OPH EXAM EST PT 1/>: CPT | Performed by: OPHTHALMOLOGY

## 2023-01-18 PROCEDURE — 92134 CPTRZ OPH DX IMG PST SGM RTA: CPT | Performed by: OPHTHALMOLOGY

## 2023-01-18 ASSESSMENT — INTRAOCULAR PRESSURE: OD: 13

## 2023-01-18 NOTE — IMPRESSION/PLAN
Impression: Total retinal detachment D1 PVR, left eye: H33.052. Plan: The findings were discussed with the patient and unfortunately he has had a recurrent total detachment and we will proceed with comfort care at this point. There is no treatment available at this time. The patient has significant capsular opasities OS and the risks and benefits of a YAG procedure was discussed with the patient and we will consider it in the near future.

## 2023-09-26 ENCOUNTER — OFFICE VISIT (OUTPATIENT)
Dept: URBAN - METROPOLITAN AREA CLINIC 85 | Facility: CLINIC | Age: 73
End: 2023-09-26
Payer: MEDICARE

## 2023-09-26 DIAGNOSIS — H33.052 TOTAL RETINAL DETACHMENT, LEFT EYE: Primary | ICD-10-CM

## 2023-09-26 PROCEDURE — 92134 CPTRZ OPH DX IMG PST SGM RTA: CPT | Performed by: OPHTHALMOLOGY

## 2023-09-26 PROCEDURE — 99214 OFFICE O/P EST MOD 30 MIN: CPT | Performed by: OPHTHALMOLOGY

## 2023-09-26 ASSESSMENT — INTRAOCULAR PRESSURE
OS: 11
OD: 14